# Patient Record
Sex: MALE | Race: WHITE | Employment: OTHER | URBAN - METROPOLITAN AREA
[De-identification: names, ages, dates, MRNs, and addresses within clinical notes are randomized per-mention and may not be internally consistent; named-entity substitution may affect disease eponyms.]

---

## 2017-08-18 PROBLEM — I70.0 ATHEROSCLEROSIS OF AORTA (HCC): Status: ACTIVE | Noted: 2017-02-25

## 2017-08-18 PROBLEM — E78.2 MIXED HYPERLIPIDEMIA: Status: ACTIVE | Noted: 2017-02-25

## 2017-08-18 PROBLEM — I10 ESSENTIAL HYPERTENSION: Status: ACTIVE | Noted: 2017-02-25

## 2017-08-18 PROBLEM — C61 PROSTATE CA (HCC): Status: ACTIVE | Noted: 2017-02-25

## 2017-08-18 PROBLEM — E53.8 VITAMIN B12 DEFICIENCY: Status: ACTIVE | Noted: 2017-02-25

## 2017-08-18 PROBLEM — E83.42 HYPOMAGNESEMIA: Status: ACTIVE | Noted: 2017-02-25

## 2017-08-18 PROBLEM — E55.9 HYPOVITAMINOSIS D: Status: ACTIVE | Noted: 2017-02-25

## 2017-08-18 PROBLEM — G47.33 OSA ON CPAP: Status: ACTIVE | Noted: 2017-02-25

## 2017-08-18 PROBLEM — Z99.89 OSA ON CPAP: Status: ACTIVE | Noted: 2017-02-25

## 2017-08-18 PROBLEM — R73.09 INCREASED GLUCOSE LEVEL: Status: ACTIVE | Noted: 2017-02-25

## 2017-08-18 PROBLEM — I71.21 ASCENDING AORTIC ANEURYSM: Status: ACTIVE | Noted: 2017-02-25

## 2017-08-18 PROBLEM — M17.10 PRIMARY LOCALIZED OSTEOARTHROSIS, LOWER LEG: Status: ACTIVE | Noted: 2017-03-28

## 2017-08-18 PROBLEM — E27.8 ADRENAL NODULE (HCC): Status: ACTIVE | Noted: 2017-02-25

## 2017-12-29 ENCOUNTER — HOSPITAL ENCOUNTER (OUTPATIENT)
Dept: LAB | Age: 77
Discharge: HOME | End: 2017-12-29
Payer: MEDICARE

## 2017-12-29 PROCEDURE — 87086 URINE CULTURE/COLONY COUNT: CPT | Performed by: UROLOGY

## 2017-12-31 LAB
BACTERIA SPEC CULT: NORMAL
SERVICE CMNT-IMP: NORMAL

## 2018-02-19 ENCOUNTER — HOSPITAL ENCOUNTER (OUTPATIENT)
Dept: LAB | Age: 78
Discharge: HOME | End: 2018-02-19
Payer: MEDICARE

## 2018-02-19 DIAGNOSIS — Z01.812 BLOOD TESTS PRIOR TO TREATMENT OR PROCEDURE: ICD-10-CM

## 2018-02-19 DIAGNOSIS — N39.0 URINARY TRACT INFECTION WITHOUT HEMATURIA, SITE UNSPECIFIED: ICD-10-CM

## 2018-02-19 DIAGNOSIS — C61 PROSTATE CANCER (HCC): ICD-10-CM

## 2018-02-19 LAB
ANION GAP SERPL CALC-SCNC: 7 MMOL/L (ref 3–18)
ATRIAL RATE: 76 BPM
BASOPHILS # BLD: 0 K/UL (ref 0–0.1)
BASOPHILS NFR BLD: 0 % (ref 0–2)
BUN SERPL-MCNC: 20 MG/DL (ref 7–18)
BUN/CREAT SERPL: 14 (ref 12–20)
CALCIUM SERPL-MCNC: 9.3 MG/DL (ref 8.5–10.1)
CALCULATED P AXIS, ECG09: 24 DEGREES
CALCULATED R AXIS, ECG10: -62 DEGREES
CALCULATED T AXIS, ECG11: 14 DEGREES
CHLORIDE SERPL-SCNC: 104 MMOL/L (ref 100–108)
CO2 SERPL-SCNC: 29 MMOL/L (ref 21–32)
CREAT SERPL-MCNC: 1.38 MG/DL (ref 0.6–1.3)
DIAGNOSIS, 93000: NORMAL
DIFFERENTIAL METHOD BLD: ABNORMAL
EOSINOPHIL # BLD: 0 K/UL (ref 0–0.4)
EOSINOPHIL NFR BLD: 1 % (ref 0–5)
ERYTHROCYTE [DISTWIDTH] IN BLOOD BY AUTOMATED COUNT: 13.5 % (ref 11.6–14.5)
GLUCOSE SERPL-MCNC: 135 MG/DL (ref 74–99)
HCT VFR BLD AUTO: 42.8 % (ref 36–48)
HGB BLD-MCNC: 14.7 G/DL (ref 13–16)
LYMPHOCYTES # BLD: 1.6 K/UL (ref 0.9–3.6)
LYMPHOCYTES NFR BLD: 19 % (ref 21–52)
MCH RBC QN AUTO: 30.2 PG (ref 24–34)
MCHC RBC AUTO-ENTMCNC: 34.3 G/DL (ref 31–37)
MCV RBC AUTO: 87.9 FL (ref 74–97)
MONOCYTES # BLD: 0.7 K/UL (ref 0.05–1.2)
MONOCYTES NFR BLD: 9 % (ref 3–10)
NEUTS SEG # BLD: 6.1 K/UL (ref 1.8–8)
NEUTS SEG NFR BLD: 71 % (ref 40–73)
P-R INTERVAL, ECG05: 190 MS
PLATELET # BLD AUTO: 240 K/UL (ref 135–420)
PMV BLD AUTO: 9.3 FL (ref 9.2–11.8)
POTASSIUM SERPL-SCNC: 3.7 MMOL/L (ref 3.5–5.5)
Q-T INTERVAL, ECG07: 368 MS
QRS DURATION, ECG06: 106 MS
QTC CALCULATION (BEZET), ECG08: 414 MS
RBC # BLD AUTO: 4.87 M/UL (ref 4.7–5.5)
SODIUM SERPL-SCNC: 140 MMOL/L (ref 136–145)
VENTRICULAR RATE, ECG03: 76 BPM
WBC # BLD AUTO: 8.4 K/UL (ref 4.6–13.2)

## 2018-02-19 PROCEDURE — 87086 URINE CULTURE/COLONY COUNT: CPT | Performed by: UROLOGY

## 2018-02-19 PROCEDURE — 36415 COLL VENOUS BLD VENIPUNCTURE: CPT | Performed by: UROLOGY

## 2018-02-19 PROCEDURE — 80048 BASIC METABOLIC PNL TOTAL CA: CPT | Performed by: OTOLARYNGOLOGY

## 2018-02-19 PROCEDURE — 93005 ELECTROCARDIOGRAM TRACING: CPT

## 2018-02-19 PROCEDURE — 85025 COMPLETE CBC W/AUTO DIFF WBC: CPT | Performed by: OTOLARYNGOLOGY

## 2018-02-21 ENCOUNTER — HOSPITAL ENCOUNTER (OUTPATIENT)
Dept: LAB | Age: 78
Discharge: HOME | End: 2018-02-21
Payer: MEDICARE

## 2018-02-21 LAB
BACTERIA SPEC CULT: NORMAL
PSA SERPL-MCNC: 4.3 NG/ML (ref 0–4)
SERVICE CMNT-IMP: NORMAL

## 2018-02-21 PROCEDURE — 36415 COLL VENOUS BLD VENIPUNCTURE: CPT | Performed by: RADIOLOGY

## 2018-02-21 PROCEDURE — 84153 ASSAY OF PSA TOTAL: CPT | Performed by: RADIOLOGY

## 2018-02-26 ENCOUNTER — HOSPITAL ENCOUNTER (OUTPATIENT)
Dept: LAB | Age: 78
Discharge: HOME | End: 2018-02-26
Payer: MEDICARE

## 2018-02-26 PROCEDURE — 88305 TISSUE EXAM BY PATHOLOGIST: CPT | Performed by: OTOLARYNGOLOGY

## 2018-07-11 ENCOUNTER — IMPORTED ENCOUNTER (OUTPATIENT)
Dept: URBAN - METROPOLITAN AREA CLINIC 1 | Facility: CLINIC | Age: 78
End: 2018-07-11

## 2018-07-11 PROBLEM — H18.413: Noted: 2018-07-11

## 2018-07-11 PROBLEM — H43.811: Noted: 2018-07-11

## 2018-07-11 PROBLEM — H25.813: Noted: 2018-07-11

## 2018-07-11 PROCEDURE — 92014 COMPRE OPH EXAM EST PT 1/>: CPT

## 2018-07-11 PROCEDURE — 92015 DETERMINE REFRACTIVE STATE: CPT

## 2018-07-11 NOTE — PATIENT DISCUSSION
1.  Cataract OU:  Visually Significant secondary to glare discussed the risks benefits alternatives and limitations of cataract surgery. The patient stated a full understanding and a desire to proceed with the procedure. The patient will need to return for preop appointment with cataract measurements and to have any additional questions answered and start pre-operative eye drops as directed. Phaco PCL OS then OD Otherwise follow-up 6 months 10/DFE/glare 2. PVD w/o Tear OD - RD precautions. 3.  Arcus TONIEetnaomi for an appointment for 10 and Ascan/H and P. with Dr. Allyson Tate.

## 2018-08-27 ENCOUNTER — IMPORTED ENCOUNTER (OUTPATIENT)
Dept: URBAN - METROPOLITAN AREA CLINIC 1 | Facility: CLINIC | Age: 78
End: 2018-08-27

## 2018-08-27 PROBLEM — H25.812: Noted: 2018-08-27

## 2018-08-27 PROCEDURE — 92136 OPHTHALMIC BIOMETRY: CPT

## 2018-08-27 NOTE — PATIENT DISCUSSION
1. Cataract OS:  Visually Significant secondary to glare discussed the risks benefits alternatives and limitations of cataract surgery. The patient stated a full understanding and a desire to proceed with the procedure. Pt understands they will need glasses post-op to achieve their best corrected vision. Phaco PCL OS (Standard lens standard procedure) Return for an appointment for Return as scheduled with Dr. Eduard Hernandez.

## 2018-09-05 ENCOUNTER — IMPORTED ENCOUNTER (OUTPATIENT)
Dept: URBAN - METROPOLITAN AREA CLINIC 1 | Facility: CLINIC | Age: 78
End: 2018-09-05

## 2018-09-05 PROBLEM — H25.812 COMBINED FORM OF SENILE CATARACT OF LEFT EYE: Status: RESOLVED | Noted: 2018-09-05 | Resolved: 2018-09-05

## 2018-09-05 PROBLEM — H25.812 COMBINED FORM OF SENILE CATARACT OF LEFT EYE: Status: ACTIVE | Noted: 2018-09-05

## 2018-09-06 ENCOUNTER — IMPORTED ENCOUNTER (OUTPATIENT)
Dept: URBAN - METROPOLITAN AREA CLINIC 1 | Facility: CLINIC | Age: 78
End: 2018-09-06

## 2018-09-06 PROBLEM — Z96.1: Noted: 2018-09-06

## 2018-09-06 PROCEDURE — 99024 POSTOP FOLLOW-UP VISIT: CPT

## 2018-09-06 NOTE — PATIENT DISCUSSION
POD#1 CE/IOL OS doing well. Continue all 3 gtts as prescribed and until gone. Use Post op Warnings Reiterated RTC as scheduled

## 2018-09-17 ENCOUNTER — IMPORTED ENCOUNTER (OUTPATIENT)
Dept: URBAN - METROPOLITAN AREA CLINIC 1 | Facility: CLINIC | Age: 78
End: 2018-09-17

## 2018-09-17 PROBLEM — H25.811: Noted: 2018-09-17

## 2018-09-17 PROBLEM — Z96.1: Noted: 2018-09-17

## 2018-09-17 PROCEDURE — 92136 OPHTHALMIC BIOMETRY: CPT

## 2018-09-17 NOTE — PATIENT DISCUSSION
1.  Cataract OD: Visually Significant secondary to glare discussed the risks benefits alternatives and limitations of cataract surgery. The patient stated a full understanding and a desire to proceed with the procedure. The patient will need to start pre-operative eye drops as directed. Proceed w/ phaco PCL OD Pt understands they will need glasses post-op to achieve their best corrected vision. 2.  POW#1  CE/IOL Standard OS doing well. Discontinue OcufloxContinue Lotemax/Durezol/Prednisolone BID until gone. Continue Prolensa/Ilevro/Acular QD until gone. 3. Return for an appointment for sx OD with Dr. Mc David.

## 2018-09-19 ENCOUNTER — IMPORTED ENCOUNTER (OUTPATIENT)
Dept: URBAN - METROPOLITAN AREA CLINIC 1 | Facility: CLINIC | Age: 78
End: 2018-09-19

## 2018-09-19 PROBLEM — H25.811 COMBINED FORM OF SENILE CATARACT OF RIGHT EYE: Status: RESOLVED | Noted: 2018-09-19 | Resolved: 2018-09-19

## 2018-09-19 PROBLEM — H25.811 COMBINED FORM OF SENILE CATARACT OF RIGHT EYE: Status: ACTIVE | Noted: 2018-09-19

## 2018-09-20 ENCOUNTER — IMPORTED ENCOUNTER (OUTPATIENT)
Dept: URBAN - METROPOLITAN AREA CLINIC 1 | Facility: CLINIC | Age: 78
End: 2018-09-20

## 2018-09-20 PROBLEM — Z96.1: Noted: 2018-09-20

## 2018-09-20 PROCEDURE — 99024 POSTOP FOLLOW-UP VISIT: CPT

## 2018-09-20 NOTE — PATIENT DISCUSSION
1. POD#1 Phaco/ PCL OD (Standard)- doing well. Continue all 3 gtts as prescribed and until gone. Use Durezol BID OD Ilevro Qdaily OD Ocuflox TID OD Post op Warnings Reiterated 2. POW#2 Phaco/ PCL OS (Standard)- doing well Continue all 3 gtts as prescribed and until gone.  Use Durezol BID OS till out Use Ilevro Qdaily OS till out Post op Warnings Reiterated RTC as scheduled

## 2018-10-11 ENCOUNTER — IMPORTED ENCOUNTER (OUTPATIENT)
Dept: URBAN - METROPOLITAN AREA CLINIC 1 | Facility: CLINIC | Age: 78
End: 2018-10-11

## 2018-10-11 PROBLEM — Z96.1: Noted: 2018-10-11

## 2018-10-11 PROCEDURE — 99024 POSTOP FOLLOW-UP VISIT: CPT

## 2018-10-11 NOTE — PATIENT DISCUSSION
POM#1 CE/IOL Standard OU doing well. Continue Lotemax/Durezol/Prednisolone BID until gone. Continue Prolensa/Ilevro/Acular QD until gone. Return for an appointment for 30 in 6 months with Dr. Елена Salinas.

## 2019-01-18 ENCOUNTER — HOSPITAL ENCOUNTER (OUTPATIENT)
Dept: PHYSICAL THERAPY | Age: 79
Discharge: HOME | End: 2019-01-18
Payer: MEDICARE

## 2019-01-18 PROCEDURE — 97112 NEUROMUSCULAR REEDUCATION: CPT

## 2019-01-18 PROCEDURE — 97162 PT EVAL MOD COMPLEX 30 MIN: CPT

## 2019-01-18 PROCEDURE — 97110 THERAPEUTIC EXERCISES: CPT

## 2019-01-18 NOTE — PROGRESS NOTES
PT DAILY TREATMENT NOTE 10-18 Patient Name: Nidia Romero Date:2019 : 1940 [x]  Patient  Verified Payor: [place] / Plan: Sid Ruiz / Product Type: Medicare / In time:8:31  Out time:9:20 Total Treatment Time (min): 49 Visit #: 1 of 8 Medicare/BCBS Only Total Timed Codes (min):  25 1:1 Treatment Time:  25 Treatment Area: Unsteadiness on feet [R26.81] SUBJECTIVE Pain Level (0-10 scale): 7/10 Any medication changes, allergies to medications, adverse drug reactions, diagnosis change, or new procedure performed?: [x] No    [] Yes (see summary sheet for update) Subjective functional status/changes:   [] No changes reported The patient has a chief complaint of decreased balance as well as low back pain. OBJECTIVE Modality rationale:  to improve the patients ability to Min Type Additional Details  
 [] Estim:  []Unatt       []IFC  []Premod []Other:  []w/ice   []w/heat Position: Location:  
 [] Estim: []Att    []TENS instruct  []NMES []Other:  []w/US   []w/ice   []w/heat Position: Location:  
 []  Traction: [] Cervical       []Lumbar 
                     [] Prone          []Supine []Intermittent   []Continuous Lbs: 
[] before manual 
[] after manual  
 []  Ultrasound: []Continuous   [] Pulsed []1MHz   []3MHz W/cm2: 
Location:  
 []  Iontophoresis with dexamethasone Location: [] Take home patch  
[] In clinic  
 []  Ice     []  heat 
[]  Ice massage 
[]  Laser  
[]  Anodyne Position: Location:  
 []  Laser with stim 
[]  Other:  Position: Location:  
 []  Vasopneumatic Device Pressure:       [] lo [] med [] hi  
Temperature: [] lo [] med [] hi  
[] Skin assessment post-treatment:  []intact []redness- no adverse reaction 
  []redness  adverse reaction:  
 
24 min []Eval                  []Re-Eval  
 
 
 8 min Therapeutic Exercise:  [] See flow sheet :  
Rationale: increase ROM and increase strength to improve the patients ability to improve ADL ease. 15 min Neuromuscular Re-education:  []  See flow sheet :  
Rationale: improve coordination, improve balance and increase proprioception  to improve the patients ability to improve ADL ease. With 
 [] TE 
 [] TA 
 [] neuro 
 [] other: Patient Education: [x] Review HEP [] Progressed/Changed HEP based on:  
[] positioning   [] body mechanics   [] transfers   [] heat/ice application   
[] other:   
 
Other Objective/Functional Measures: See IE Pain Level (0-10 scale) post treatment: 2/10 ASSESSMENT/Changes in Function: See POC. Patient will continue to benefit from skilled PT services to modify and progress therapeutic interventions, address functional mobility deficits, address ROM deficits, address strength deficits, analyze and address soft tissue restrictions, analyze and cue movement patterns, analyze and modify body mechanics/ergonomics, assess and modify postural abnormalities, address imbalance/dizziness and instruct in home and community integration to attain remaining goals. [x]  See Plan of Care 
[]  See progress note/recertification 
[]  See Discharge Summary Progress towards goals / Updated goals: 
Short Term Goals: To be accomplished in 2 weeks: 1. The patient will demonstrate independence and compliance with HEP to maximize therapeutic benefit. 2. The patient will demonstrate modified tandem stance on airex to maximize balance Long Term Goals: To be accomplished in 4 weeks: 1. The patient will improve FOTO score to 60 to maximize quality of life. 2. The patient will improve DGI to 20/24 to reduce falls risk. 3. The patient will demonstrate effective turns without LOB in clinic to reduce falls risk. 4. The patient will demonstrate sit to stand from chair without UEs to 6 x in 30\" to improve efficiency of transfers. PLAN 
[]  Upgrade activities as tolerated     [x]  Continue plan of care 
[]  Update interventions per flow sheet      
[]  Discharge due to:_ 
[]  Other:_   
 
Rosetta Pierre, PT 1/18/2019  9:35 AM 
 
Future Appointments Date Time Provider Kelly Baxter 1/22/2019 10:00 AM Yee Knox, PT [facility]  
1/25/2019 12:00 PM Sevilla Karley, PTA [facility]  
1/29/2019  9:30 AM Sevilla Karley, PTA [facility]  
2/1/2019  9:00 AM Sabina Herr, PT [facility]  
2/5/2019 10:00 AM Sevilla Karley, PTA [facility]  
2/7/2019 10:00 AM Sevilla Karley, PTA [facility]  
2/12/2019 10:00 AM Sevilla Karley, PTA [facility]
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
## 2019-01-22 ENCOUNTER — HOSPITAL ENCOUNTER (OUTPATIENT)
Dept: PHYSICAL THERAPY | Age: 79
Discharge: HOME | End: 2019-01-22
Payer: MEDICARE

## 2019-01-22 PROCEDURE — 97112 NEUROMUSCULAR REEDUCATION: CPT

## 2019-01-22 PROCEDURE — 97110 THERAPEUTIC EXERCISES: CPT

## 2019-01-22 NOTE — PROGRESS NOTES
PT DAILY TREATMENT NOTE 10-18 Patient Name: Gisel January Date:2019 : 1940 [x]  Patient  Verified Payor: [place] MED[place]ARE / Plan: Sid Ruiz / Product Type: Medicare / In time:10:00  Out time:10:38 Total Treatment Time (min): 38 Visit #: 2 of 8 Medicare/BCBS Only Total Timed Codes (min):  38 1:1 Treatment Time:  35 Treatment Area: Unsteadiness on feet [R26.81] SUBJECTIVE Pain Level (0-10 scale): 7 Any medication changes, allergies to medications, adverse drug reactions, diagnosis change, or new procedure performed?: [x] No    [] Yes (see summary sheet for update) Subjective functional status/changes:   [] No changes reported \"The exercises are going well\" OBJECTIVE 15 min Therapeutic Exercise:  [x] See flow sheet :  
Rationale: increase ROM and increase strength to improve the patients ability to perform ADLs with improved ease. 18 min Neuromuscular Re-education:  []  See flow sheet :  
Rationale: improve coordination, improve balance and increase proprioception  to improve the patients ability to perform functional activities with improved stability. With 
 [] TE 
 [] TA 
 [] neuro 
 [] other: Patient Education: [x] Review HEP [] Progressed/Changed HEP based on:  
[] positioning   [] body mechanics   [] transfers   [] heat/ice application   
[] other:   
 
Other Objective/Functional Measures: Initiated exercises per flow sheet, patients first follow up since initial evaluation. Pain Level (0-10 scale) post treatment: 4 
 
ASSESSMENT/Changes in Function: Significant pain reduction post session. Patient challenged with balance activities especially with dynamic gait activities. Patient required cueing throughout session in order to control movements and decrease speed in order to facilitate proper form. Patient demonstrated difficulty with small michaela negotiation requiring intermittent CGA for steadying. Patient will continue to benefit from skilled PT services to modify and progress therapeutic interventions, address functional mobility deficits, address ROM deficits, address strength deficits, analyze and cue movement patterns, analyze and modify body mechanics/ergonomics, assess and modify postural abnormalities and address imbalance/dizziness to attain remaining goals. [x]  See Plan of Care 
[]  See progress note/recertification 
[]  See Discharge Summary Progress towards goals / Updated goals: 
Short Term Goals: To be accomplished in 2 weeks: 
            9. The patient will demonstrate independence and compliance with HEP to maximize therapeutic benefit. Goal met 1/22/19 - per patient report. 
            5. The patient will demonstrate modified tandem stance on airex to maximize balance Long Term Goals: To be accomplished in 4 weeks: 
            1. The patient will improve FOTO score to 60 to maximize quality of life. 
            2. The patient will improve DGI to 20/24 to reduce falls risk. 
            3. The patient will demonstrate effective turns without LOB in clinic to reduce falls risk. 
            4. The patient will demonstrate sit to stand from chair without UEs to 6 x in 30\" to improve efficiency of transfers. PLAN 
[]  Upgrade activities as tolerated     [x]  Continue plan of care 
[]  Update interventions per flow sheet      
[]  Discharge due to:_ 
[]  Other:_ Franchesca Baca, PT 1/22/2019  10:20 AM 
 
Future Appointments Date Time Provider Kelly Baxter 1/25/2019 12:00 PM Shayano [place], PTA [facility]  
1/29/2019  9:30 AM Milo [place], PTA [facility]  
2/1/2019  9:00 AM Melissa Dorantes, PT [facility]  
2/5/2019 10:00 AM Milo [place], PTA [facility]  
2/7/2019 10:00 AM Milo [place], PTA [facility]  
2/12/2019 10:00 AM Milo [place], PTA [facility]

## 2019-01-25 ENCOUNTER — HOSPITAL ENCOUNTER (OUTPATIENT)
Dept: PHYSICAL THERAPY | Age: 79
Discharge: HOME | End: 2019-01-25
Payer: MEDICARE

## 2019-01-25 PROCEDURE — 97110 THERAPEUTIC EXERCISES: CPT

## 2019-01-25 PROCEDURE — 97112 NEUROMUSCULAR REEDUCATION: CPT

## 2019-01-29 ENCOUNTER — HOSPITAL ENCOUNTER (OUTPATIENT)
Dept: PHYSICAL THERAPY | Age: 79
Discharge: HOME | End: 2019-01-29
Payer: MEDICARE

## 2019-01-29 PROCEDURE — 97112 NEUROMUSCULAR REEDUCATION: CPT

## 2019-01-29 PROCEDURE — 97110 THERAPEUTIC EXERCISES: CPT

## 2019-01-29 NOTE — PROGRESS NOTES
PT DAILY TREATMENT NOTE 10-18 Patient Name: Portillo Ward Date:2019 : 1940 [x]  Patient  Verified Payor: [place] MEDICARE / Plan: Sid Ruiz / Product Type: Medicare / In time:9:30  Out time:10:13 Total Treatment Time (min): 43 Visit #: 4 of 8 Medicare/BCBS Only Total Timed Codes (min):  43 1:1 Treatment Time:  34 Treatment Area: Unsteadiness on feet [R26.81] SUBJECTIVE Pain Level (0-10 scale): 10 Any medication changes, allergies to medications, adverse drug reactions, diagnosis change, or new procedure performed?: [x] No    [] Yes (see summary sheet for update) Subjective functional status/changes:   [] No changes reported \"Back has been really sore. I think we might have done too much last time. \" OBJECTIVE 23 min Therapeutic Exercise:  [x] See flow sheet :  
Rationale: increase strength and increase proprioception to improve the patients ability to perform ADL's. 20 min Neuromuscular Re-education:  [x]  See flow sheet :  
Rationale: increase strength, improve balance and increase proprioception  to improve the patients ability to perform functional activities. With 
 [x] TE 
 [] TA 
 [] neuro 
 [] other: Patient Education: [x] Review HEP [] Progressed/Changed HEP based on:  
[] positioning   [] body mechanics   [] transfers   [] heat/ice application   
[] other:   
 
Other Objective/Functional Measures: Held BOSU lunge with reach secondary to increased low back pain. MSR on airex for 30\", required min(A) to correct LOB. Pain Level (0-10 scale) post treatment: 3/10 ASSESSMENT/Changes in Function: Extensive cueing to correct TA recruitment, significant rib flaring when ky. Difficulty balancing during trapeze bar series.  
 
Patient will continue to benefit from skilled PT services to address functional mobility deficits, address strength deficits, analyze and cue movement patterns, analyze and modify body mechanics/ergonomics and address imbalance/dizziness to attain remaining goals. [x]  See Plan of Care 
[]  See progress note/recertification 
[]  See Discharge Summary Progress towards goals / Updated goals: 
Short Term Goals: To be accomplished in 2 weeks: 
            9. The patient will demonstrate independence and compliance with HEP to maximize therapeutic benefit. Goal met 1/22/19 - per patient report. 
            3. The patient will demonstrate modified tandem stance on airex to maximize balance - MSR on airex for 30\", required min(A) to correct LOB. 1/29/2019 Long Term Goals: To be accomplished in 4 weeks: 
            1. The patient will improve FOTO score to 60 to maximize quality of life. 
            2. The patient will improve DGI to 20/24 to reduce falls risk. 
            3. The patient will demonstrate effective turns without LOB in clinic to reduce falls risk. 
            4. The patient will demonstrate sit to stand from chair without UEs to 6 x in 30\" to improve efficiency of transfers. PLAN 
[]  Upgrade activities as tolerated     [x]  Continue plan of care 
[]  Update interventions per flow sheet      
[]  Discharge due to:_ 
[]  Other:_   
 
Jessika Jacob, ROHIT 1/29/2019  9:13 AM 
 
Future Appointments Date Time Provider Kelly Baxter 1/29/2019  9:30 AM [place], PTA [facility]  
2/1/2019  9:00 AM Osmani Mix, PT [facility]  
2/5/2019 10:00 AM [place] Risk, PTA [facility]  
2/7/2019 10:00 AM [place] Risk, PTA [facility]  
2/12/2019 10:00 AM [place] Risk, PTA [facility]

## 2019-02-01 ENCOUNTER — HOSPITAL ENCOUNTER (OUTPATIENT)
Dept: PHYSICAL THERAPY | Age: 79
Discharge: HOME | End: 2019-02-01
Payer: MEDICARE

## 2019-02-01 PROCEDURE — 97110 THERAPEUTIC EXERCISES: CPT

## 2019-02-01 PROCEDURE — 97112 NEUROMUSCULAR REEDUCATION: CPT

## 2019-02-01 NOTE — PROGRESS NOTES
PT DAILY TREATMENT NOTE 10-18 Patient Name: Aleshia Rose Date:2019 : 1940 [x]  Patient  Verified Payor: [place] MEDICARE / Plan: Sdi Ruiz / Product Type: Medicare / In time:9:00  Out time:9:38 Total Treatment Time (min): 38 Visit #: 5 of 8 Medicare/BCBS Only Total Timed Codes (min):  38 1:1 Treatment Time:  32 Treatment Area: Unsteadiness on feet [R26.81] SUBJECTIVE Pain Level (0-10 scale): 4/10 Any medication changes, allergies to medications, adverse drug reactions, diagnosis change, or new procedure performed?: [x] No    [] Yes (see summary sheet for update) Subjective functional status/changes:   [] No changes reported \"My balance is still going off to the right and I still have problems with it. \" OBJECTIVE 8 min Therapeutic Exercise:  [x] See flow sheet :  
Rationale: increase ROM and increase strength to improve the patients ability to improve ADL ease. 30 min Neuromuscular Re-education:  [x]  See flow sheet :  
Rationale: improve coordination, improve balance and increase proprioception  to improve the patients ability to improve ADL ease. With 
 [] TE 
 [] TA 
 [] neuro 
 [] other: Patient Education: [x] Review HEP [] Progressed/Changed HEP based on:  
[] positioning   [] body mechanics   [] transfers   [] heat/ice application   
[] other:   
 
Other Objective/Functional Measures:  
Notable LOB with head turns to right. Pt does require frequent cues through program to slow down, as he does rush through exercises, particularly neuro-re-ed activites requiring a SLS component. Pain Level (0-10 scale) post treatment: 3/10 ASSESSMENT/Changes in Function: No increase in LBP during session. Recommend patient continue dynamic balance work to reduce risk of falls.  
 
Patient will continue to benefit from skilled PT services to modify and progress therapeutic interventions, address functional mobility deficits, address ROM deficits, address strength deficits, analyze and address soft tissue restrictions, analyze and cue movement patterns, analyze and modify body mechanics/ergonomics, assess and modify postural abnormalities, address imbalance/dizziness and instruct in home and community integration to attain remaining goals. []  See Plan of Care 
[]  See progress note/recertification 
[]  See Discharge Summary Progress towards goals / Updated goals: 
Short Term Goals: To be accomplished in 2 weeks: 
            9. The patient will demonstrate independence and compliance with HEP to maximize therapeutic benefit. Goal met 1/22/19 - per patient report. 
            7. The patient will demonstrate modified tandem stance on airex to maximize balance - MSR on airex for 30\", required min(A) to correct LOB. 1/29/2019 Long Term Goals: To be accomplished in 4 weeks: 
            1. The patient will improve FOTO score to 60 to maximize quality of life. 
            2. The patient will improve DGI to 20/24 to reduce falls risk. 
            3. The patient will demonstrate effective turns without LOB in clinic to reduce falls risk. Progressing, no LOB during  
            4. The patient will demonstrate sit to stand from chair without UEs to 6 x in 30\" to improve efficiency of transfers. PLAN 
[]  Upgrade activities as tolerated     [x]  Continue plan of care 
[]  Update interventions per flow sheet      
[]  Discharge due to:_ 
[]  Other:_   
 
Ailyn Blum, PT 2/1/2019  9:11 AM 
 
Future Appointments Date Time Provider Kelly Baxter 2/5/2019 10:00 AM Oralee Cough, PTA [facility]  
2/7/2019 10:00 AM Oralee Cough, PTA [facility]  
2/12/2019 10:00 AM Oralee Cough, PTA [facility]

## 2019-02-05 ENCOUNTER — HOSPITAL ENCOUNTER (OUTPATIENT)
Dept: PHYSICAL THERAPY | Age: 79
Discharge: HOME | End: 2019-02-05
Payer: MEDICARE

## 2019-02-05 PROCEDURE — 97110 THERAPEUTIC EXERCISES: CPT

## 2019-02-05 PROCEDURE — 97112 NEUROMUSCULAR REEDUCATION: CPT

## 2019-02-05 NOTE — PROGRESS NOTES
PT DAILY TREATMENT NOTE 10-18 Patient Name: Kyle Marina Date:2019 : 1940 [x]  Patient  Verified Payor: [place] MED[place] / Plan: Sid Quesada / Product Type: Medicare / In time:10:00  Out time:10:34 Total Treatment Time (min): 34 Visit #: 6 of 8 Medicare/BCBS Only Total Timed Codes (min):  34 1:1 Treatment Time:  23 Treatment Area: Unsteadiness on feet [R26.81] SUBJECTIVE Pain Level (0-10 scale): 4/10 Any medication changes, allergies to medications, adverse drug reactions, diagnosis change, or new procedure performed?: [x] No    [] Yes (see summary sheet for update) Subjective functional status/changes:   [] No changes reported \"Knee is bothering me this morning. \" OBJECTIVE 14 min Therapeutic Exercise:  [x] See flow sheet :  
Rationale: increase ROM and increase strength to improve the patients ability to perform ADL's. 20 min Neuromuscular Re-education:  [x]  See flow sheet :  
Rationale: increase strength, improve balance and increase proprioception  to improve the patients ability to perform functional activities. With 
 [x] TE 
 [] TA 
 [] neuro 
 [] other: Patient Education: [x] Review HEP [] Progressed/Changed HEP based on:  
[] positioning   [] body mechanics   [] transfers   [] heat/ice application   
[] other:   
 
Other Objective/Functional Measures: 8\" step taps while standing on airex. Pain Level (0-10 scale) post treatment: 2/10 ASSESSMENT/Changes in Function: Occasional step correction to regain LOB while performing dynamic gait. Cueing throughout treatment to decrease speed of reps. Continue PT to further improve balance to decrease risk of falls.  
 
Patient will continue to benefit from skilled PT services to modify and progress therapeutic interventions, address functional mobility deficits, address ROM deficits, address strength deficits, analyze and cue movement patterns, analyze and modify body mechanics/ergonomics and address imbalance/dizziness to attain remaining goals. [x]  See Plan of Care 
[]  See progress note/recertification 
[]  See Discharge Summary Progress towards goals / Updated goals: 
Short Term Goals: To be accomplished in 2 weeks: 
            0. The patient will demonstrate independence and compliance with HEP to maximize therapeutic benefit. Goal met 1/22/19 - per patient report. 
            0. The patient will demonstrate modified tandem stance on airex to maximize balance - MSR on airex for 30\", required min(A) to correct LOB. 1/29/2019 Long Term Goals: To be accomplished in 4 weeks: 
            1. The patient will improve FOTO score to 60 to maximize quality of life. 
            2. The patient will improve DGI to 20/24 to reduce falls risk. 
            3. The patient will demonstrate effective turns without LOB in clinic to reduce falls risk. Progressing, no LOB during  
            4. The patient will demonstrate sit to stand from chair without UEs to 6 x in 30\" to improve efficiency of transfers. PLAN 
[]  Upgrade activities as tolerated     [x]  Continue plan of care 
[]  Update interventions per flow sheet      
[]  Discharge due to:_ 
[]  Other:_   
 
Anam Iyer PTA 2/5/2019  9:39 AM 
 
Future Appointments Date Time Provider Kelly Baxter 2/5/2019 10:00 AM Danny Bello PTA [facility]  
2/7/2019 10:00 AM [facility]  
2/12/2019 10:00 AM [facility]

## 2019-02-07 ENCOUNTER — HOSPITAL ENCOUNTER (OUTPATIENT)
Dept: PHYSICAL THERAPY | Age: 79
Discharge: HOME | End: 2019-02-07
Payer: MEDICARE

## 2019-02-07 PROCEDURE — 97110 THERAPEUTIC EXERCISES: CPT

## 2019-02-07 PROCEDURE — 97112 NEUROMUSCULAR REEDUCATION: CPT

## 2019-02-07 NOTE — PROGRESS NOTES
PT DAILY TREATMENT NOTE 10-18 Patient Name: Didier Roles Date:2019 : 1940 [x]  Patient  Verified Payor: [place] MEDICARE / Plan: Katheryn Pastrana / Product Type: Medicare / In time:10:03  Out time:10:41 Total Treatment Time (min): 38 Visit #: 7 of 8 Medicare/BCBS Only Total Timed Codes (min):  38 1:1 Treatment Time:  34 Treatment Area: Unsteadiness on feet [R26.81] SUBJECTIVE Pain Level (0-10 scale): 3/10 Any medication changes, allergies to medications, adverse drug reactions, diagnosis change, or new procedure performed?: [x] No    [] Yes (see summary sheet for update) Subjective functional status/changes:   [x] No changes reported OBJECTIVE 18 min Therapeutic Exercise:  [x] See flow sheet :  
Rationale: increase strength and increase proprioception to improve the patients ability to perform ADL's. 20 min Neuromuscular Re-education:  [x]  See flow sheet :  
Rationale: improve balance and increase proprioception  to improve the patients ability to perform functional activities and negotiate community distances safely. With 
 [x] TE 
 [] TA 
 [] neuro 
 [] other: Patient Education: [x] Review HEP [] Progressed/Changed HEP based on:  
[] positioning   [] body mechanics   [] transfers   [] heat/ice application   
[] other:   
 
Other Objective/Functional Measures: Added grapevine 60'x2. Sit to stand from chair without armrests 10x in 30\". Pain Level (0-10 scale) post treatment: 3/10 ASSESSMENT/Changes in Function: Pt presented slightly unsteadier during today's treatment, primarily with static balance and toe taps. Reassess remaining LTG's next visit for possible D/C or continuation for 2 weeks.  
 
Patient will continue to benefit from skilled PT services to modify and progress therapeutic interventions, address functional mobility deficits, address strength deficits, analyze and cue movement patterns, analyze and modify body mechanics/ergonomics and address imbalance/dizziness to attain remaining goals. [x]  See Plan of Care 
[]  See progress note/recertification 
[]  See Discharge Summary Progress towards goals / Updated goals: 
Short Term Goals: To be accomplished in 2 weeks: 
            8. The patient will demonstrate independence and compliance with HEP to maximize therapeutic benefit. Goal met 1/22/19 - per patient report. 
            4. The patient will demonstrate modified tandem stance on airex to maximize balance - MSR on airex for 30\", required min(A) to correct LOB. 1/29/2019 Long Term Goals: To be accomplished in 4 weeks: 
            1. The patient will improve FOTO score to 60 to maximize quality of life. 
            2. The patient will improve DGI to 20/24 to reduce falls risk. 
            3. The patient will demonstrate effective turns without LOB in clinic to reduce falls risk. Progressing, no LOB during  
            4. The patient will demonstrate sit to stand from chair without UEs to 6 x in 30\" to improve efficiency of transfers. - Met, Sit to stand from chair without armrests 10x in 30\". 2/7/2019 PLAN 
[]  Upgrade activities as tolerated     [x]  Continue plan of care 
[]  Update interventions per flow sheet      
[]  Discharge due to:_ 
[]  Other:_   
 
Vkiki Dominguez PTA 2/7/2019  9:45 AM 
 
Future Appointments Date Time Provider Kelly Baxter 2/7/2019 10:00 AM [facility]  
2/12/2019 10:00 AM [facility]

## 2019-02-12 ENCOUNTER — HOSPITAL ENCOUNTER (OUTPATIENT)
Dept: PHYSICAL THERAPY | Age: 79
Discharge: HOME | End: 2019-02-12
Payer: MEDICARE

## 2019-02-12 PROCEDURE — 97110 THERAPEUTIC EXERCISES: CPT

## 2019-02-12 PROCEDURE — 97112 NEUROMUSCULAR REEDUCATION: CPT

## 2019-02-12 NOTE — PROGRESS NOTES
PT DAILY TREATMENT NOTE 10-18 Patient Name: Maude Forth Date:2019 : 1940 [x]  Patient  Verified Payor: [place] MEDICARE / Plan: Sid Ruiz / Product Type: Medicare / In time:10:00  Out time:10:41 Total Treatment Time (min): 41 Visit #: 8 of 8 Medicare/BCBS Only Total Timed Codes (min):  41 1:1 Treatment Time:  30 Treatment Area: Unsteadiness on feet [R26.81] SUBJECTIVE Pain Level (0-10 scale): 4/10 Any medication changes, allergies to medications, adverse drug reactions, diagnosis change, or new procedure performed?: [x] No    [] Yes (see summary sheet for update) Subjective functional status/changes:   [x] No changes reported OBJECTIVE 21 min Therapeutic Exercise:  [x] See flow sheet :  
Rationale: increase ROM and increase strength to improve the patients ability to perform ADL's. 20 min Neuromuscular Re-education:  [x]  See flow sheet :  
Rationale: increase strength, improve balance and increase proprioception  to improve the patients ability to perform functional activities. With 
 [x] TE 
 [] TA 
 [] neuro 
 [] other: Patient Education: [x] Review HEP [] Progressed/Changed HEP based on:  
[] positioning   [] body mechanics   [] transfers   [] heat/ice application   
[] other:   
 
Other Objective/Functional Measures: FOTO 52%. DGI . Pt has made some improvement since [facility]. Recommend continue PT to improve stair negotiation. Continues to occasional spontaneous movements with cause pt to appear as though he is losing balance and/or correcting balance, may partially be related to personality as pt jokes a lot during treatment. Pain Level (0-10 scale) post treatment: 2-3/10 ASSESSMENT/Changes in Function: 
  
[]  See Plan of Care [x]  See progress note/recertification 
[]  See Discharge Summary Progress towards goals / Updated goals: 
Short Term Goals: To be accomplished in 2 weeks:             1. The patient will demonstrate independence and compliance with HEP to maximize therapeutic benefit. Goal met 1/22/19 - per patient report. 
            8. The patient will demonstrate modified tandem stance on airex to maximize balance - MSR on airex for 30\", required min(A) to correct LOB. 1/29/2019 Long Term Goals: To be accomplished in 4 weeks: 
            1. The patient will improve FOTO score to 60 to maximize quality of life. - FOTO improved to 52%. 2/12/2019 
            2. The patient will improve DGI to 20/24 to reduce falls risk. - DGI 19/24. 2/12/2019 
            3. The patient will demonstrate effective turns without LOB in clinic to reduce falls risk. Progressing, no LOB during  
            4. The patient will demonstrate sit to stand from chair without UEs to 6 x in 30\" to improve efficiency of transfers. - Met, Sit to stand from chair without armrests 10x in 30\". 2/7/2019 PLAN 
[]  Upgrade activities as tolerated     [x]  Continue plan of care 
[]  Update interventions per flow sheet      
[]  Discharge due to:_ 
[]  Other:_   
 
Anam Iyer PTA 2/12/2019  9:43 AM 
 
Future Appointments Date Time Provider Kelly Baxter 2/12/2019 10:00 AM [facility]

## 2019-02-12 NOTE — PROGRESS NOTES
In [address]  [place] [address] [place], [address] 
[phone] fax Medicare Progress Report Patient name: Didi Berg Start of Care: 2019 Referral source: Garfield Cunningham MD : 1940 Medical Diagnosis: Unsteadiness on feet [R26.81] Payor: Jeane Otero / Plan: [place] MEDICARE PART A & B / Product Type: Medicare /  Onset Date:Since  Treatment Diagnosis: Decreased balance Prior Hospitalization: see medical history Provider#: [id number] Medications: Verified on Patient summary List  
 Comorbidities: Arthritis, HTN, visual impairment, hx prostate cx, right TKR, heart disease, hernia repair, Neuropathy Prior Level of Function: The patient states that he had improved ease of balance including ambulation, sit to stands, and turning prior to . Visits from Start of Care: 8    Missed Visits: 0 Reporting Period: 2019 to 2019 Subjective Reports:  
 
Current Status/ treatment goals Objective measures Short Term Goals: To be accomplished in 2 weeks: 
            8. The patient will demonstrate independence and compliance with HEP to maximize therapeutic benefit. Goal met 19 - per patient report. 
            5. The patient will demonstrate modified tandem stance on airex to maximize balance - MSR on airex for 30\", required min(A) to correct LOB. 2019 Long Term Goals: To be accomplished in 4 weeks: 
            1. The patient will improve FOTO score to 60 to maximize quality of life. - FOTO improved to 52%. 2019 
            2. The patient will improve DGI to 20/24 to reduce falls risk. - DGI . 2019 
            3. The patient will demonstrate effective turns without LOB in clinic to reduce falls risk. Progressing, no LOB during  
            4.  The patient will demonstrate sit to stand from chair without UEs to 6 x in 30\" to improve efficiency of transfers. - Met, Sit to stand from chair without armrests 10x in 30\". 2/7/2019 Key functional changes: FOTO score improvement from 43 to 52. DGI improvement from  18/24 to 19/24. The patient has demonstrated sit to stands 10 times in 30\" meeting goal. 
 
Problems/ barriers to goal attainment: None, though PTA indicates, \"Continues to occasional spontaneous movements with cause pt to appear as though he is losing balance and/or correcting balance, may partially be related to personality as pt jokes a lot during treatment. \" Assessment / Recommendations: The patient has progressed with his FOTO score indicating improved quality of life, slight improvement with DGI allowing for improved balance, and notable improvement in efficiency of sit to stands. The patient will continue to benefit from skilled PT in order to address remaining deficits in order to progress balance and reduce falls risk. Problem List: pain affecting function, decrease ROM, decrease strength, impaired gait/ balance, decrease ADL/ functional abilitiies, decrease activity tolerance, decrease flexibility/ joint mobility and decrease transfer abilities Treatment Plan: Therapeutic exercise, Therapeutic activities, Neuromuscular re-education, Physical agent/modality, Gait/balance training, Manual therapy, Patient education, Self Care training, Functional mobility training, Home safety training and Stair training Patient Goal (s) has been updated and includes:   
 
Updated Goals to be accomplished in 3 weeks: 
            1. The patient will improve FOTO score to 60 to maximize quality of life. - FOTO improved to 52%. 2/12/2019 
            2. The patient will improve DGI to 20/24 to reduce falls risk. - DGI 19/24. 2/12/2019 
            3. The patient will demonstrate effective turns without LOB in clinic to reduce falls risk.  Progressing, 2/12/2019 
           
 Frequency / Duration: Patient to be seen 2 times per week for 3 weeks: 
 
 
James Valentin, PT 2/12/2019 1:41 PM

## 2019-02-15 ENCOUNTER — HOSPITAL ENCOUNTER (OUTPATIENT)
Dept: PHYSICAL THERAPY | Age: 79
Discharge: HOME | End: 2019-02-15
Payer: MEDICARE

## 2019-02-15 PROCEDURE — 97112 NEUROMUSCULAR REEDUCATION: CPT

## 2019-02-15 PROCEDURE — 97110 THERAPEUTIC EXERCISES: CPT

## 2019-02-15 NOTE — PROGRESS NOTES
PT DAILY TREATMENT NOTE 10-18 Patient Name: Tommy Velasquez Date:2/15/2019 : 1940 [x]  Patient  Verified Payor: [place] MEDICARE / Plan: Sid Ruiz / Product Type: Medicare / In time:3:00  Out time:3:39 Total Treatment Time (min): 39 Visit #: 1 of 6 Medicare/BCBS Only Total Timed Codes (min):  39 1:1 Treatment Time:  44 Treatment Area: Unsteadiness on feet [R26.81] SUBJECTIVE Pain Level (0-10 scale): 3-4/10 Any medication changes, allergies to medications, adverse drug reactions, diagnosis change, or new procedure performed?: [x] No    [] Yes (see summary sheet for update) Subjective functional status/changes:   [] No changes reported The patient states that he continues to feel like he is listing to the right but feels this is more of a component of his knee. [id number] min Therapeutic Exercise:  [x] See flow sheet :  
Rationale: increase ROM and increase strength to improve the patients ability to improve ADL ease. 29 min Neuromuscular Re-education:  [x]  See flow sheet :  
Rationale: improve coordination, improve balance and increase proprioception  to improve the patients ability to improve ADL ease. With 
 [] TE 
 [] TA 
 [] neuro 
 [] other: Patient Education: [x] Review HEP [] Progressed/Changed HEP based on:  
[] positioning   [] body mechanics   [] transfers   [] heat/ice application   
[] other:   
 
Other Objective/Functional Measures:  
No LOB noted during session. Increased sway appreciated with EC feet together. The patient requires considerable cues to slow down and control weight shifting particularly during reaching activity on unsteady surface. Pain Level (0-10 scale) post treatment: 0/10 ASSESSMENT/Changes in Function: The patient attained a decrease in low back pain during session. Excellent sit to stand ease without UE use. He left the clinic in no apparent distress. Patient will continue to benefit from skilled PT services to modify and progress therapeutic interventions, address functional mobility deficits, address ROM deficits, address strength deficits, analyze and address soft tissue restrictions, analyze and cue movement patterns, analyze and modify body mechanics/ergonomics, assess and modify postural abnormalities, address imbalance/dizziness and instruct in home and community integration to attain remaining goals. []  See Plan of Care 
[]  See progress note/recertification 
[]  See Discharge Summary Progress towards goals / Updated goals: 
Updated Goals to be accomplished in 3 weeks: 
            1. The patient will improve FOTO score to 60 to maximize quality of life. - FOTO improved to 52%. 2/12/2019 
            2. The patient will improve DGI to 20/24 to reduce falls risk. - DGI 19/24. 2/12/2019 
            3. The patient will demonstrate effective turns without LOB in clinic to reduce falls risk. Progressing, 2/12/2019 PLAN 
[]  Upgrade activities as tolerated     [x]  Continue plan of care 
[]  Update interventions per flow sheet      
[]  Discharge due to:_ 
[]  Other:_   
 
Zulma Magaña, PT 2/15/2019  4:16 PM 
 
Future Appointments Date Time Provider Kelly Baxter 2/18/2019  2:00 PM Jennifer Phlegm, [facility]  
2/21/2019  8:30 AM Jennifer Phlegm, PTA [facility]  
2/28/2019  8:30 AM Jennifer Phlegm, PTA [facility]

## 2019-02-18 ENCOUNTER — HOSPITAL ENCOUNTER (OUTPATIENT)
Dept: PHYSICAL THERAPY | Age: 79
Discharge: HOME | End: 2019-02-18
Payer: MEDICARE

## 2019-02-18 PROCEDURE — 97110 THERAPEUTIC EXERCISES: CPT

## 2019-02-18 PROCEDURE — 97112 NEUROMUSCULAR REEDUCATION: CPT

## 2019-02-18 NOTE — PROGRESS NOTES
PT DAILY TREATMENT NOTE 10-18 Patient Name: Juan J Boudreaux Date:2019 : 1940 [x]  Patient  Verified Payor: [place] MEDICARE / Plan: Sid Ruiz / Product Type: Medicare / In time:2:00  Out time:2:45 Total Treatment Time (min): 45 Visit #: 2 of 6 Medicare/BCBS Only Total Timed Codes (min):  45 1:1 Treatment Time:  45  
 
 
Treatment Area: Unsteadiness on feet [R26.81] SUBJECTIVE Pain Level (0-10 scale): 210 Any medication changes, allergies to medications, adverse drug reactions, diagnosis change, or new procedure performed?: [x] No    [] Yes (see summary sheet for update) Subjective functional status/changes:   [] No changes reported \"Just a little pain in my knees. \" OBJECTIVE 12 min Therapeutic Exercise:  [x] See flow sheet :  
Rationale: increase ROM and increase strength to improve the patients ability to perform ADL's. 
 
33 min Neuromuscular Re-education:  [x]  See flow sheet :  
Rationale: improve coordination, improve balance and increase proprioception  to improve the patients ability to perform functional activities and dynamic activities. With 
 [x] TE 
 [] TA 
 [] neuro 
 [] other: Patient Education: [x] Review HEP [] Progressed/Changed HEP based on:  
[] positioning   [] body mechanics   [] transfers   [] heat/ice application   
[] other:   
 
Other Objective/Functional Measures: Changed static balance to MSR on airex with head turns and tandem on airex EO 30\" each. Added 1/2 barrel LE ext and donkey kicks, added 4\" step downs. Pain Level (0-10 scale) post treatment: 2/10 ASSESSMENT/Changes in Function: Pt denied dizziness when unsteady. Pt reports difficulty negotiating down steps in the community Required 1 handrail to maintain balance with step downs. Continue PT to improve ease of negotiating community distances safely and negotiating up/down steps/curbs.  
 
Patient will continue to benefit from skilled PT services to modify and progress therapeutic interventions, address functional mobility deficits, address ROM deficits, address strength deficits, analyze and modify body mechanics/ergonomics and address imbalance/dizziness to attain remaining goals. [x]  See Plan of Care 
[]  See progress note/recertification 
[]  See Discharge Summary Progress towards goals / Updated goals: 
Updated Goals to be accomplished in 3 weeks: 
            1. The patient will improve FOTO score to 60 to maximize quality of life. - FOTO improved to 52%. 2/12/2019 
            2. The patient will improve DGI to 20/24 to reduce falls risk. - DGI 19/24. 2/12/2019 
            3. The patient will demonstrate effective turns without LOB in clinic to reduce falls risk. Progressing, 2/12/2019 PLAN 
[]  Upgrade activities as tolerated     [x]  Continue plan of care 
[]  Update interventions per flow sheet      
[]  Discharge due to:_  
[]  Other:_   
 
Dartha Collet, PTA 2/18/2019  1:45 PM 
 
Future Appointments Date Time Provider Kelly Baxter 2/18/2019  2:00 [facility]  
2/21/2019  8:30 AM [facility]  
2/28/2019  8:30 AM Hammad Dear, [facility]

## 2019-02-21 ENCOUNTER — HOSPITAL ENCOUNTER (OUTPATIENT)
Dept: PHYSICAL THERAPY | Age: 79
Discharge: HOME | End: 2019-02-21
Payer: MEDICARE

## 2019-02-21 PROCEDURE — 97112 NEUROMUSCULAR REEDUCATION: CPT

## 2019-02-21 NOTE — PROGRESS NOTES
PT DAILY TREATMENT NOTE 10-18 Patient Name: Marissa Schmid Date:2019 : 1940 [x]  Patient  Verified Payor: [place] MEDICARE / Plan: Sid Quesadalauri / Product Type: Medicare / In time:8:30  Out time:9:06 Total Treatment Time (min): 36 Visit #: 3 of 6 Medicare/BCBS Only Total Timed Codes (min):  36 1:1 Treatment Time:  32 Treatment Area: Unsteadiness on feet [R26.81] SUBJECTIVE Pain Level (0-10 scale): 1-2/10 Any medication changes, allergies to medications, adverse drug reactions, diagnosis change, or new procedure performed?: [x] No    [] Yes (see summary sheet for update) Subjective functional status/changes:   [] No changes reported \"I think my balance is a little off today but I'm feeling fine. \" OBJECTIVE 12 min Therapeutic Exercise:  [x] See flow sheet :  
Rationale: increase ROM and increase strength to improve the patients ability to perform ADL's. 
 
24 min Neuromuscular Re-education:  [x]  See flow sheet :  
Rationale: improve balance and increase proprioception  to improve the patients ability to perform functional/dynamic activities in the community with decrease risk of falling. With 
 [x] TE 
 [] TA 
 [] neuro 
 [] other: Patient Education: [x] Review HEP [] Progressed/Changed HEP based on:  
[] positioning   [] body mechanics   [] transfers   [] heat/ice application   
[] other:   
 
Other Objective/Functional Measures: No change in there ex. Pain Level (0-10 scale) post treatment: -2/10 ASSESSMENT/Changes in Function: Occasional min(A) with gait belt to correct balance during 1/2 lunge reaches and ambulation with headturns. Improved ease performing 4\" step downs. Plan to D/C to HEP after next visit.  
 
Patient will continue to benefit from skilled PT services to modify and progress therapeutic interventions, address ROM deficits, address strength deficits, analyze and cue movement patterns, analyze and modify body mechanics/ergonomics and address imbalance/dizziness to attain remaining goals. [x]  See Plan of Care 
[]  See progress note/recertification 
[]  See Discharge Summary Progress towards goals / Updated goals: 
Updated Goals to be accomplished in 3 weeks: 
            1. The patient will improve FOTO score to 60 to maximize quality of life. - FOTO improved to 52%. 2/12/2019 
            2. The patient will improve DGI to 20/24 to reduce falls risk. - DGI 19/24. 2/12/2019 
            3. The patient will demonstrate effective turns without LOB in clinic to reduce falls risk. Progressing, 2/12/2019 PLAN 
[]  Upgrade activities as tolerated     [x]  Continue plan of care 
[]  Update interventions per flow sheet      
[]  Discharge due to:_ 
[]  Other:_   
 
Jessika Jacob PTA 2/21/2019  8:40 AM 
 
Future Appointments Date Time Provider Kelly Baxter 2/28/2019  8:30 AM Maya Alfaro PTA [facility]

## 2019-02-28 ENCOUNTER — APPOINTMENT (OUTPATIENT)
Dept: PHYSICAL THERAPY | Age: 79
End: 2019-02-28
Payer: MEDICARE

## 2019-03-15 NOTE — PROGRESS NOTES
[facility]  [address]  [place], [address]  [phone] fax    Physical Therapy Discharge Summary    Patient name: Alcides Gimenez Start of Care: 2019   Referral source: Derrick Borden MD : [id number]               Medical Diagnosis: Unsteadiness on feet [R26.81]  Payor: [address] [place] [address] / Plan: [place] PART [place] & B / Product Type: Medicare /  Onset Date:Since                Treatment Diagnosis: Decreased balance   Prior Hospitalization: see medical history Provider#: [id number]   Medications: Verified on Patient summary List    Comorbidities: Arthritis, HTN, visual impairment, hx prostate cx, right TKR, heart disease, hernia repair, Neuropathy   Prior Level of Function: The patient states that he had improved ease of balance including ambulation, sit to stands, and turning prior to . Visits from Start of Care: 11    Missed Visits: 1  Reporting Period : 2019 to 2019    Summary of Care:  Updated Goals to be accomplished in 3 weeks:              1. The patient will improve FOTO score to 60 to maximize quality of life. - FOTO improved to 52%. 2019              2. The patient will improve DGI to 20/24 to reduce falls risk. - DGI 19/24. 2019              3. The patient will demonstrate effective turns without LOB in clinic to reduce falls risk. Progressing, 2019      ASSESSMENT/RECOMMENDATIONS: The patient has made progress towards his goals as listed above. He had one additional visit, of which we had planned on re-assessment, but the patient opted to D/C prior to completion of PT per communication log.      [x]Discontinue therapy:     Zulma Magaña, PT 3/15/2019 7:41 AM

## 2019-04-11 ENCOUNTER — IMPORTED ENCOUNTER (OUTPATIENT)
Dept: URBAN - METROPOLITAN AREA CLINIC 1 | Facility: CLINIC | Age: 79
End: 2019-04-11

## 2019-04-11 PROBLEM — H43.813: Noted: 2019-04-11

## 2019-04-11 PROBLEM — Z96.1: Noted: 2019-04-11

## 2019-04-11 PROBLEM — H04.123: Noted: 2019-04-11

## 2019-04-11 PROCEDURE — 92014 COMPRE OPH EXAM EST PT 1/>: CPT

## 2019-04-11 NOTE — PATIENT DISCUSSION
1.  Dry Eyes OU - Use ATs TID OU Routinely. 2.  PVD OU - RD precautions. 3.  Pseudophakia OU - (Standard OU) Letter to PCP Pt deferred MRx Return for an appointment in 1 yr 27 with Dr. Coco Gresham.

## 2020-01-24 NOTE — PROGRESS NOTES
PT DAILY TREATMENT NOTE 10-18 Patient Name: Edelmira Cummings Date:2019 : 1940 [x]  Patient  Verified Payor: [place] MED[place]ARE / Plan: Sid Ruiz / Product Type: Medicare / In time:12:00  Out time:12:46 Total Treatment Time (min): 46 Visit #: 3 of 8 Medicare/BCBS Only Total Timed Codes (min):  46 1:1 Treatment Time:  46  
 
 
Treatment Area: Unsteadiness on feet [R26.81] SUBJECTIVE Pain Level (0-10 scale): 0/10 Any medication changes, allergies to medications, adverse drug reactions, diagnosis change, or new procedure performed?: [x] No    [] Yes (see summary sheet for update) Subjective functional status/changes:   [] No changes reported \"No pain. Some numbness in my legs because I have neuropathy. \" OBJECTIVE 26 min Therapeutic Exercise:  [x] See flow sheet :  
Rationale: increase strength and increase proprioception to improve the patients ability to perform ADL's. 20 min Neuromuscular Re-education:  [x]  See flow sheet :  
Rationale: improve balance and increase proprioception  to improve the patients ability to safely negotiate community distances. With 
 [x] TE 
 [] TA 
 [] neuro 
 [] other: Patient Education: [x] Review HEP [] Progressed/Changed HEP based on:  
[] positioning   [] body mechanics   [] transfers   [] heat/ice application   
[] other:   
 
Other Objective/Functional Measures: Increased stationary bike resistance to level 3. Added BOSU reaching and 8\" step with airex reaching 30\"x2 each. Pain Level (0-10 scale) post treatment: 0/10 ASSESSMENT/Changes in Function: Challenged balance with horizontal head turns and C/S flex/ext during gait. Pt puts forth great effort in clinic, cueing to decrease speed of reps and exercises. Continue PT to improve (B) glute strength and balance to improve ease of performing functional activities.  
 
Patient will continue to benefit from skilled PT services to modify and progress therapeutic interventions, address functional mobility deficits, address strength deficits, analyze and cue movement patterns, analyze and modify body mechanics/ergonomics and address imbalance/dizziness to attain remaining goals. [x]  See Plan of Care 
[]  See progress note/recertification 
[]  See Discharge Summary Progress towards goals / Updated goals: 
Short Term Goals: To be accomplished in 2 weeks: 
            7. The patient will demonstrate independence and compliance with HEP to maximize therapeutic benefit. Goal met 1/22/19 - per patient report. 
            5. The patient will demonstrate modified tandem stance on airex to maximize balance Long Term Goals: To be accomplished in 4 weeks: 
            1. The patient will improve FOTO score to 60 to maximize quality of life. 
            2. The patient will improve DGI to 20/24 to reduce falls risk. 
            3. The patient will demonstrate effective turns without LOB in clinic to reduce falls risk. 
            4. The patient will demonstrate sit to stand from chair without UEs to 6 x in 30\" to improve efficiency of transfers. PLAN 
[]  Upgrade activities as tolerated     [x]  Continue plan of care 
[]  Update interventions per flow sheet      
[]  Discharge due to:_ 
[]  Other:_   
 
Amanda Shanks PTA 1/25/2019  11:46 AM 
 
Future Appointments Date Time Provider Kelly Baxter 1/25/2019 12:00 PM Beckie Herndon PTA [facility]  
1/29/2019  9:30 AM Beckie Hrendon PTA [facility]  
2/1/2019  9:00 AM Lethia Harada, IVONNE [facility]  
2/5/2019 10:00 AM Beckie Herndon PTA [facility]  
2/7/2019 10:00 AM Beckie Herndon PTA [facility]  
2/12/2019 10:00 AM Beckie Herndon PTA [facility]  
 
 
 SR with frequent PVCs@ 87 BPM

## 2020-08-31 ENCOUNTER — IMPORTED ENCOUNTER (OUTPATIENT)
Dept: URBAN - METROPOLITAN AREA CLINIC 1 | Facility: CLINIC | Age: 80
End: 2020-08-31

## 2020-08-31 PROBLEM — Z96.1: Noted: 2020-08-31

## 2020-08-31 PROBLEM — H04.123: Noted: 2020-08-31

## 2020-08-31 PROBLEM — H43.813: Noted: 2020-08-31

## 2020-08-31 PROCEDURE — 92014 COMPRE OPH EXAM EST PT 1/>: CPT

## 2020-08-31 NOTE — PATIENT DISCUSSION
1.  Dry Eyes OU -- Continue Routine use of ATs TID OU. 2.  Pseudophakia OU -- Standard OU. Doing well. 3.  PVD OU -- Old Stable. RD precautions. Patient defers the refraction at today's visitReturn for an appointment in 1 year for a 30 with Dr. Janeen Francois.

## 2022-04-02 ASSESSMENT — VISUAL ACUITY
OS_CC: 20/30+1
OD_GLARE: 20/400
OD_SC: 20/40
OS_GLARE: 20/400
OS_CC: J1
OS_CC: 20/30
OS_CC: 20/40-2
OS_SC: 20/400
OS_SC: 20/400
OD_SC: 20/20
OS_CC: 20/20-2
OD_SC: 20/25-2
OS_SC: 20/20
OS_CC: 20/25
OD_CC: J1
OD_SC: 20/40
OD_CC: 20/20
OD_CC: 20/25+2
OD_GLARE: 20/400
OS_GLARE: 20/400
OD_CC: 20/30
OS_SC: 20/25+1

## 2022-04-02 ASSESSMENT — TONOMETRY
OD_IOP_MMHG: 12
OS_IOP_MMHG: 11
OS_IOP_MMHG: 15
OD_IOP_MMHG: 13
OS_IOP_MMHG: 9
OD_IOP_MMHG: 13
OD_IOP_MMHG: 14
OS_IOP_MMHG: 12
OD_IOP_MMHG: 14
OS_IOP_MMHG: 14
OD_IOP_MMHG: 14
OS_IOP_MMHG: 13